# Patient Record
Sex: FEMALE | Race: WHITE | Employment: FULL TIME | URBAN - METROPOLITAN AREA
[De-identification: names, ages, dates, MRNs, and addresses within clinical notes are randomized per-mention and may not be internally consistent; named-entity substitution may affect disease eponyms.]

---

## 2017-04-20 LAB
CREATININE, EXTERNAL: 0.63
HBA1C MFR BLD HPLC: 5.9 %

## 2018-06-06 ENCOUNTER — OFFICE VISIT (OUTPATIENT)
Dept: ENDOCRINOLOGY | Age: 53
End: 2018-06-06

## 2018-06-06 VITALS
BODY MASS INDEX: 31.52 KG/M2 | TEMPERATURE: 97.9 F | HEIGHT: 67 IN | SYSTOLIC BLOOD PRESSURE: 140 MMHG | RESPIRATION RATE: 14 BRPM | WEIGHT: 200.8 LBS | OXYGEN SATURATION: 96 % | DIASTOLIC BLOOD PRESSURE: 75 MMHG | HEART RATE: 70 BPM

## 2018-06-06 DIAGNOSIS — E06.3 HYPOTHYROIDISM DUE TO HASHIMOTO'S THYROIDITIS: Primary | ICD-10-CM

## 2018-06-06 DIAGNOSIS — E03.9 ACQUIRED HYPOTHYROIDISM: Primary | ICD-10-CM

## 2018-06-06 DIAGNOSIS — E28.2 PCOS (POLYCYSTIC OVARIAN SYNDROME): ICD-10-CM

## 2018-06-06 DIAGNOSIS — E03.8 HYPOTHYROIDISM DUE TO HASHIMOTO'S THYROIDITIS: Primary | ICD-10-CM

## 2018-06-06 RX ORDER — FENOFIBRATE 160 MG/1
160 TABLET ORAL DAILY
COMMUNITY

## 2018-06-06 RX ORDER — LISINOPRIL 20 MG/1
30 TABLET ORAL DAILY
COMMUNITY

## 2018-06-06 RX ORDER — LEVOTHYROXINE SODIUM 200 UG/1
200 TABLET ORAL
Qty: 34 TAB | Refills: 11 | Status: SHIPPED | OUTPATIENT
Start: 2018-06-06 | End: 2019-05-31 | Stop reason: SDUPTHER

## 2018-06-06 RX ORDER — PRAVASTATIN SODIUM 20 MG/1
20 TABLET ORAL
COMMUNITY
End: 2020-10-12

## 2018-06-06 RX ORDER — OMEGA-3-ACID ETHYL ESTERS 1 G/1
2 CAPSULE, LIQUID FILLED ORAL 2 TIMES DAILY
COMMUNITY

## 2018-06-06 RX ORDER — LEVOTHYROXINE SODIUM 200 UG/1
TABLET ORAL
COMMUNITY
End: 2018-06-06 | Stop reason: DRUGHIGH

## 2018-06-06 RX ORDER — METFORMIN HYDROCHLORIDE 500 MG/1
1000 TABLET ORAL 2 TIMES DAILY WITH MEALS
COMMUNITY

## 2018-06-06 RX ORDER — LEVOTHYROXINE SODIUM 25 UG/1
TABLET ORAL
COMMUNITY
End: 2018-06-06 | Stop reason: DRUGHIGH

## 2018-06-06 NOTE — PROGRESS NOTES
Gene Deluna is a 46 y.o. female here for   Chief Complaint   Patient presents with    New Patient     Thyroid       1. Have you been to the ER, urgent care clinic since your last visit? Hospitalized since your last visit? - no    2. Have you seen or consulted any other health care providers outside of the [address] [place] [address] since your last visit?   Include any pap smears or colon screening.- PCP

## 2018-06-06 NOTE — PROGRESS NOTES
[facility]               Roger Blanco MD        [address] 78 [phone] Fax [phone]             Patient Information  Date:6/9/2018  Name : Starla Kilgore 46 y.o.     YOB: 1965         Referred by: Kathy Pham MD         History of present illness    Starla Kilgore is a 46 y.o. female  here for evaluation of thyroid. She had Graves' disease status post radioactive iodine therapy followed by post ablation hypothyroidism on Thyroxine replacement. Reports fluctuating TSH. She is taking the medication consistently by itself. Complains of fatigue, cold intolerance, gaining weight but attributes to lack of exercise. She is also menopausal now. History of PCO S.    Hirsutism status post laser therapy. No history of gluten sensitivity. Family history of thyroid disease. Father has type 1 diabetes      No change in the size of the neck or neck pain. No dysphagia,dysphonia or dyspnea. No history of known radiation exposure      Wt Readings from Last 3 Encounters:   06/06/18 200 lb 12.8 oz (91.1 kg)       Past Medical History:   Diagnosis Date    DM [facility]diabetes mellitus) ([facility] [address]     Fatty liver     HTN (hypertension)     Hyperlipidemia     Hypothyroidism     PCOS (polycystic ovarian syndrome)     Rosacea     Sleep apnea        Current Outpatient Prescriptions   Medication Sig    omega-3 acid ethyl esters (LOVAZA) 1 gram capsule Take 2 g by mouth two (2) times a day.  fenofibrate (LOFIBRA) 160 mg tablet Take 160 mg by mouth daily.  pravastatin (PRAVACHOL) 20 mg tablet Take 20 mg by mouth nightly.  metFORMIN (GLUCOPHAGE) 500 mg tablet Take  by mouth two (2) times daily (with meals).  lisinopril (PRINIVIL, ZESTRIL) 10 mg tablet Take  by mouth daily.  UNITHROID 200 mcg tablet Take 1 Tab by mouth Daily (before breakfast). Take 2 tabs on Sundays.  Brand medically necessary     No current facility-administered medications for this visit. No Known Allergies      Review of Systems:  All 10 systems  reviewed and are negative other than mentioned in HPI    Physical Examination:  Blood pressure 140/75, pulse 70, temperature 97.9 °F (36.6 °C), temperature source Oral, resp. rate 14, height 5' 7\" (1.702 m), weight 200 lb 12.8 oz (91.1 kg), SpO2 96 %. - General: pleasant, no distress, good eye contact  - HEENT: no exopthalmos, no periorbital edema, no scleral/conjunctival injection, EOMI, no lid lag or stare  - Neck: supple, no  thyromegaly, no nodules,no lymphadenopathy  - Cardiovascular: regular, normal rate, normal S1 and S2,  - Respiratory: clear to auscultation bilaterally  - Gastrointestinal: soft, nontender, nondistended, BS +  - Musculoskeletal: no proximal muscle weakness in upper or lower extremities  - Integumentary: no tremors, no edema,  - Neurological:alert and oriented   - Psychiatric: normal mood and affect    Data Reviewed:     [] Reviewed labs      Assessment/Plan:     1. Hypothyroidism due to Hashimoto's thyroiditis        Primary hypothyroidism   Discussed the natural course of hypothyroidism and instructions for levothyroxine. Discussed about the various factors which can affect TSH including medications,weight change,illness, compliance and instructions to take LT4 by itself to keep the levels stable. Discussed various causes of weight gain ,association with other autoimmune conditions,stressed the importance of life style changes. Switch to Unithroid 200 mcg 1 tablet daily, 2 tablets on Sunday  Follow TSH. Likely not absorbing the medication as she is on a higher dose than her body weight    PCOS -discussed the metabolic risks, continue metformin  History of DVT  Postmenopausal  Discussed importance of exercise    There are no Patient Instructions on file for this visit. Follow-up Disposition:  Return in about 5 months (around 11/6/2018).         Patient /caregiver verbalized understanding Voice-recognition software was used to generate this report, which may result in some phonetic-based errors in the grammar and contents. Even though attempts were made to correct all the mistakes, some may have been missed and remained in the body of the report.

## 2018-06-06 NOTE — MR AVS SNAPSHOT
[address] [facility] [address] 
[phone] Patient: Amita Lopez MRN: [id number] Visit Information Date & Time Provider Department Dept. Phone Encounter #  
 6/6/2018  9:00 AM Steph Layton MD [facility] Diabetes & Endocrinology [phone] [id number] Follow-up Instructions Return in about 5 months (around 11/6/2018). Upcoming Health Maintenance Date Due Hepatitis C Screening 1965 DTaP/Tdap/Td series (1 - Tdap) 9/20/1986 PAP AKA CERVICAL CYTOLOGY 9/20/1986 BREAST CANCER SCRN MAMMOGRAM 9/20/2015 FOBT Q 1 YEAR AGE 50-75 9/20/2015 Influenza Age 5 to Adult 8/1/2018 Allergies as of 6/6/2018  Review Complete On: 6/6/2018 By: Steph Layton MD  
 No Known Allergies Current Immunizations  Never Reviewed No immunizations on file. Not reviewed this visit You Were Diagnosed With   
  
 Codes Comments Hypothyroidism due to Hashimoto's thyroiditis    -  Primary ICD-10-CM: E03.8, E06.3 ICD-9-CM: 244.8, 245.2 Vitals BP Pulse Temp Resp Height(growth percentile) Weight(growth percentile) 140/75 (BP 1 Location: Right arm, BP Patient Position: Sitting) 70 97.9 °F (36.6 °C) (Oral) 14 5' 7\" (1.702 m) 200 lb 12.8 oz (91.1 kg) SpO2 BMI Smoking Status 96% 31.45 kg/m2 Never Smoker Vitals History BMI and BSA Data Body Mass Index Body Surface Area  
 31.45 kg/m 2 2.08 m 2 Your Updated Medication List  
  
   
This list is accurate as of 6/6/18 10:08 AM.  Always use your most recent med list.  
  
  
  
  
 fenofibrate 160 mg tablet Commonly known as:  LOFIBRA Take 160 mg by mouth daily. * levothyroxine 200 mcg tablet Commonly known as:  SYNTHROID Take  by mouth Daily (before breakfast). * levothyroxine 25 mcg tablet Commonly known as:  SYNTHROID Take  by mouth Daily (before breakfast). lisinopril 10 mg tablet Commonly known as:  Joaquin Jacy Take  by mouth daily. metFORMIN 500 mg tablet Commonly known as:  GLUCOPHAGE Take  by mouth two (2) times daily (with meals). omega-3 acid ethyl esters 1 gram capsule Commonly known as:  Shayne Catena Take 2 g by mouth two (2) times a day. pravastatin 20 mg tablet Commonly known as:  PRAVACHOL Take 20 mg by mouth nightly. * Notice: This list has 2 medication(s) that are the same as other medications prescribed for you. Read the directions carefully, and ask your doctor or other care provider to review them with you. Follow-up Instructions Return in about 5 months (around 11/6/2018). Introducing [facility]! Aida Mccormack introduces [employer] patient portal. Now you can access parts of your medical record, email your doctor's office, and request medication refills online. 1. In your internet browser, go to [email or web address] 2. Click on the First Time User? Click Here link in the Sign In box. You will see the New Member Sign Up page. 3. Enter your [employer] Access Code exactly as it appears below. You will not need to use this code after youve completed the sign-up process. If you do not sign up before the expiration date, you must request a new code. · [employer] Access Code: [email or web address] Expires: 9/4/2018 10:06 AM 
 
4. Enter the last four digits of your Social Security Number (xxxx) and Date of Birth (mm/dd/yyyy) as indicated and click Submit. You will be taken to the next sign-up page. 5. Create a [employer] ID. This will be your [employer] login ID and cannot be changed, so think of one that is secure and easy to remember. 6. Create a [employer] password. You can change your password at any time. 7. Enter your Password Reset Question and Answer. This can be used at a later time if you forget your password. 8. Enter your e-mail address. You will receive e-mail notification when new information is available in [address] 9. Click Sign Up. You can now view and download portions of your medical record. 10. Click the Download Summary menu link to download a portable copy of your medical information. If you have questions, please visit the Frequently Asked Questions section of the [employer] website. Remember, [employer] is NOT to be used for urgent needs. For medical emergencies, dial 911. Now available from your iPhone and Android! Please provide this summary of care documentation to your next provider. Your primary care clinician is listed as Donavon Cox. If you have any questions after today's visit, please call [phone].

## 2018-06-09 PROBLEM — E03.8 HYPOTHYROIDISM DUE TO HASHIMOTO'S THYROIDITIS: Status: ACTIVE | Noted: 2018-06-09

## 2018-06-09 PROBLEM — E06.3 HYPOTHYROIDISM DUE TO HASHIMOTO'S THYROIDITIS: Status: ACTIVE | Noted: 2018-06-09

## 2018-06-09 PROBLEM — E28.2 PCOS (POLYCYSTIC OVARIAN SYNDROME): Status: ACTIVE | Noted: 2018-06-09

## 2019-02-04 ENCOUNTER — OFFICE VISIT (OUTPATIENT)
Dept: ENDOCRINOLOGY | Age: 54
End: 2019-02-04

## 2019-02-04 VITALS
DIASTOLIC BLOOD PRESSURE: 80 MMHG | HEIGHT: 67 IN | BODY MASS INDEX: 31.66 KG/M2 | HEART RATE: 82 BPM | TEMPERATURE: 99.1 F | WEIGHT: 201.7 LBS | SYSTOLIC BLOOD PRESSURE: 160 MMHG | RESPIRATION RATE: 14 BRPM

## 2019-02-04 DIAGNOSIS — E03.8 HYPOTHYROIDISM DUE TO HASHIMOTO'S THYROIDITIS: ICD-10-CM

## 2019-02-04 DIAGNOSIS — E06.3 HYPOTHYROIDISM DUE TO HASHIMOTO'S THYROIDITIS: ICD-10-CM

## 2019-02-04 DIAGNOSIS — E28.2 PCOS (POLYCYSTIC OVARIAN SYNDROME): Primary | ICD-10-CM

## 2019-02-04 RX ORDER — MENTHOL
400 GEL (GRAM) TOPICAL 2 TIMES DAILY
COMMUNITY

## 2019-02-04 RX ORDER — ROSUVASTATIN CALCIUM 40 MG/1
TABLET, COATED ORAL
Refills: 4 | COMMUNITY
Start: 2019-01-28

## 2019-02-04 RX ORDER — NORETHINDRONE ACETATE AND ETHINYL ESTRADIOL AND FERROUS FUMARATE 1MG-20(21)
KIT ORAL
Refills: 3 | COMMUNITY
Start: 2019-01-31 | End: 2019-08-12

## 2019-02-04 NOTE — PROGRESS NOTES
[facility] Eloisa Corea MD 
 
  
 
 
Patient Information Date:2/4/2019 Name : Maxi Fagan 48 y.o.    
YOB: 1965 Referred by: Miguel Pittman MD  
 
 
 
History of present illness Maxi Fagan is a 48 y.o. female  here for evaluation of thyroid. She had Graves' disease status post radioactive iodine therapy followed by post ablation hypothyroidism on Thyroxine replacement. Reports fluctuating TSH. She is taking the medication consistently by itself. Complains of fatigue, cold intolerance, gaining weight but attributes to lack of exercise. She is also menopausal now. History of PCOS. Hirsutism status post laser therapy. No history of gluten sensitivity. Family history of thyroid disease. Father has type 1 diabetes She is on Unithroid, taking it consistently Reduced activity, used to exercise in the past and felt better No change in the size of the neck or neck pain. No dysphagia,dysphonia or dyspnea. No history of known radiation exposure Wt Readings from Last 3 Encounters:  
02/04/19 201 lb 11.2 oz (91.5 kg) 06/06/18 200 lb 12.8 oz (91.1 kg) Past Medical History:  
Diagnosis Date  DM (diabetes mellitus) ([address]  Fatty liver  HTN (hypertension)  Hyperlipidemia  Hypothyroidism  PCOS (polycystic ovarian syndrome)  Rosacea  Sleep apnea Current Outpatient Medications Medication Sig  
 vitamin e (E GEMS) 1,000 unit capsule Take 1,000 Units by mouth two (2) times a day.  JUNEL FE 1/20, 28, 1 mg-20 mcg (21)/75 mg (7) tab TK 1 T PO ONCE A DAY FOR 28 DAYS  omega-3 acid ethyl esters (LOVAZA) 1 gram capsule Take 2 g by mouth two (2) times a day.  fenofibrate (LOFIBRA) 160 mg tablet Take 160 mg by mouth daily.  pravastatin (PRAVACHOL) 20 mg tablet Take 20 mg by mouth nightly.  metFORMIN (GLUCOPHAGE) 500 mg tablet Take  by mouth two (2) times daily (with meals).  lisinopril (PRINIVIL, ZESTRIL) 20 mg tablet Take 20 mg by mouth daily.  UNITHROID 200 mcg tablet Take 1 Tab by mouth Daily (before breakfast). Take 2 tabs on Sundays. Brand medically necessary  rosuvastatin (CRESTOR) 40 mg tablet TK 1 T PO QD No current facility-administered medications for this visit. No Known Allergies Review of Systems:  All 10 systems  reviewed and are negative other than mentioned in HPI Physical Examination: 
Blood pressure 160/80, pulse 82, temperature 99.1 °F (37.3 °C), temperature source Oral, resp. rate 14, height 5' 7\" (1.702 m), weight 201 lb 11.2 oz (91.5 kg). - General: pleasant, no distress, good eye contact 
- HEENT: no exopthalmos, no periorbital edema, no scleral/conjunctival injection, EOMI, no lid lag or stare 
- Neck: supple, no  thyromegaly,  
- Cardiovascular: regular, normal rate, normal S1 and S2, 
- Respiratory: clear to auscultation bilaterally - Gastrointestinal: soft, nontender, nondistended, BS + 
- Musculoskeletal: no proximal muscle weakness in upper or lower extremities - Integumentary: no tremors, no edema, 
- Neurological:alert and oriented - Psychiatric: normal mood and affect Data Reviewed:  
 
[] Reviewed labs Assessment/Plan: 1. PCOS (polycystic ovarian syndrome) 2. Hypothyroidism due to Hashimoto's thyroiditis Primary hypothyroidism Unithroid 200 mcg 1 tablet daily, 2 tablets on Sunday PCOS -understands metabolic risks, continue metformin History of DVT Postmenopausal 
Discussed importance of exercise There are no Patient Instructions on file for this visit. Follow-up Disposition: Not on File Patient /caregiver verbalized understanding Voice-recognition software was used to generate this report, which may result in some phonetic-based errors in the grammar and contents.   Even though attempts were made to correct all the mistakes, some may have been missed and remained in the body of the report.

## 2019-02-04 NOTE — PROGRESS NOTES
Nicolle Thornton is a 48 y.o. female here for Chief Complaint Patient presents with  Thyroid Problem 1. Have you been to the ER, urgent care clinic since your last visit? Hospitalized since your last visit? -no 
 
2. Have you seen or consulted any other health care providers outside of the [address] [place] [address] since your last visit? Include any pap smears or colon screening. -PCP

## 2019-05-31 DIAGNOSIS — E03.9 ACQUIRED HYPOTHYROIDISM: ICD-10-CM

## 2019-05-31 RX ORDER — LEVOTHYROXINE SODIUM 200 UG/1
TABLET ORAL
Qty: 34 TAB | Refills: 0 | Status: SHIPPED | OUTPATIENT
Start: 2019-05-31 | End: 2019-06-28 | Stop reason: SDUPTHER

## 2019-06-28 DIAGNOSIS — E03.9 ACQUIRED HYPOTHYROIDISM: ICD-10-CM

## 2019-06-28 RX ORDER — LEVOTHYROXINE SODIUM 200 UG/1
TABLET ORAL
Qty: 34 TAB | Refills: 0 | Status: SHIPPED | OUTPATIENT
Start: 2019-06-28 | End: 2019-07-25 | Stop reason: SDUPTHER

## 2019-07-03 LAB
CREATININE, EXTERNAL: 0.68
HBA1C MFR BLD HPLC: 9.7 %
LDL-C, EXTERNAL: 40

## 2019-07-25 DIAGNOSIS — E03.9 ACQUIRED HYPOTHYROIDISM: ICD-10-CM

## 2019-07-25 RX ORDER — LEVOTHYROXINE SODIUM 200 UG/1
TABLET ORAL
Qty: 34 TAB | Refills: 0 | Status: SHIPPED | OUTPATIENT
Start: 2019-07-25 | End: 2019-08-12 | Stop reason: SDUPTHER

## 2019-08-09 NOTE — PROGRESS NOTES
Jennie Lofton is a 48 y.o. female here for   Chief Complaint   Patient presents with    Thyroid Problem    PCOS       1. Have you been to the ER, urgent care clinic since your last visit? Hospitalized since your last visit? -no    2. Have you seen or consulted any other health care providers outside of the [address] [place] [address] since your last visit? Include any pap smears or colon screening. -PCP

## 2019-08-12 ENCOUNTER — OFFICE VISIT (OUTPATIENT)
Dept: ENDOCRINOLOGY | Age: 54
End: 2019-08-12

## 2019-08-12 VITALS
WEIGHT: 198 LBS | BODY MASS INDEX: 31.08 KG/M2 | HEIGHT: 67 IN | OXYGEN SATURATION: 97 % | SYSTOLIC BLOOD PRESSURE: 142 MMHG | DIASTOLIC BLOOD PRESSURE: 72 MMHG | HEART RATE: 72 BPM | RESPIRATION RATE: 14 BRPM | TEMPERATURE: 98.4 F

## 2019-08-12 DIAGNOSIS — E28.2 PCOS (POLYCYSTIC OVARIAN SYNDROME): Primary | ICD-10-CM

## 2019-08-12 DIAGNOSIS — E03.8 HYPOTHYROIDISM DUE TO HASHIMOTO'S THYROIDITIS: ICD-10-CM

## 2019-08-12 DIAGNOSIS — E06.3 HYPOTHYROIDISM DUE TO HASHIMOTO'S THYROIDITIS: ICD-10-CM

## 2019-08-12 DIAGNOSIS — E03.9 ACQUIRED HYPOTHYROIDISM: ICD-10-CM

## 2019-08-12 RX ORDER — LEVOTHYROXINE SODIUM 200 UG/1
TABLET ORAL
Qty: 34 TAB | Refills: 0 | Status: SHIPPED | OUTPATIENT
Start: 2019-08-12 | End: 2019-08-12 | Stop reason: SDUPTHER

## 2019-08-12 RX ORDER — LEVOTHYROXINE SODIUM 200 UG/1
TABLET ORAL
Qty: 30 TAB | Refills: 11 | Status: SHIPPED | OUTPATIENT
Start: 2019-08-12 | End: 2020-02-10 | Stop reason: SDUPTHER

## 2019-08-12 NOTE — LETTER
8/15/19 Patient: Ilana Mooney YOB: 1965 Date of Visit: 8/12/2019 Nathanael Closs, MD 
[address] [facility] [address] [facility] [address] VIA Facsimile: [phone] Dear Nathanael Closs, MD, Thank you for referring Ms. Joana Ceballos to [facility]OLOGY for evaluation. My notes for this consultation are attached. If you have questions, please do not hesitate to call me. I look forward to following your patient along with you. Sincerely, Kael Umaña MD

## 2019-08-12 NOTE — PROGRESS NOTES
Biju Lofton MD           Patient Information  Date:8/12/2019  Name : Maureen Mart 48 y.o.     YOB: 1965         Referred by: Raquel Monte MD         History of present illness    Maureen Mart is a 48 y.o. female  here for follow-up of thyroid. She had Graves' disease status post radioactive iodine therapy followed by post ablation hypothyroidism on Thyroxine replacement. History of PCOS  Postmenopausal  She has discontinued OCPs  Had labs at PCPs office  Hirsutism status post laser therapy. No history of gluten sensitivity. Family history of thyroid disease. Father has type 1 diabetes  She is on Unithroid, taking it consistently  Lost couple of pounds        No change in the size of the neck or neck pain. No dysphagia,dysphonia or dyspnea. No history of known radiation exposure      Wt Readings from Last 3 Encounters:   08/12/19 198 lb (89.8 kg)   02/04/19 201 lb 11.2 oz (91.5 kg)   06/06/18 200 lb 12.8 oz (91.1 kg)       Past Medical History:   Diagnosis Date    DM (diabetes mellitus) ([address]     Fatty liver     HTN (hypertension)     Hyperlipidemia     Hypothyroidism     PCOS (polycystic ovarian syndrome)     Rosacea     Sleep apnea        Current Outpatient Medications   Medication Sig    UNITHROID 200 mcg tablet TAKE 1 TABLET BY MOUTH DAILY BEFORE BREAKFAST. TABLET 2 TABLETS BY MOUTH ON SUNDAY    vitamin e (E GEMS) 1,000 unit capsule Take 1,000 Units by mouth two (2) times a day.  rosuvastatin (CRESTOR) 40 mg tablet TK 1 T PO QD    omega-3 acid ethyl esters (LOVAZA) 1 gram capsule Take 2 g by mouth two (2) times a day.  fenofibrate (LOFIBRA) 160 mg tablet Take 160 mg by mouth daily.  metFORMIN (GLUCOPHAGE) 500 mg tablet Take  by mouth two (2) times daily (with meals).  lisinopril (PRINIVIL, ZESTRIL) 20 mg tablet Take 20 mg by mouth daily.     JUNEL FE 1/20, 28, 1 mg-20 mcg (21)/75 mg (7) tab TK 1 T PO ONCE A DAY FOR 28 DAYS    pravastatin (PRAVACHOL) 20 mg tablet Take 20 mg by mouth nightly. No current facility-administered medications for this visit. No Known Allergies      Review of Systems:  All 10 systems  reviewed and are negative other than mentioned in HPI    Physical Examination:  Blood pressure 142/72, pulse 72, temperature 98.4 °F (36.9 °C), temperature source Oral, resp. rate 14, height 5' 7\" (1.702 m), weight 198 lb (89.8 kg), SpO2 97 %. - General: pleasant, no distress, good eye contact  - HEENT: no exopthalmos, no periorbital edema, no scleral/conjunctival injection, EOMI, no lid lag or stare  - Neck: supple, no  thyromegaly,   - Cardiovascular: regular, normal rate, normal S1 and S2,  - Respiratory: clear to auscultation bilaterally  - Gastrointestinal: soft, nontender, nondistended, BS +  - Musculoskeletal: no proximal muscle weakness in upper or lower extremities  - Integumentary: no tremors, no edema,  - Neurological:alert and oriented   - Psychiatric: normal mood and affect    Data Reviewed:     [] Reviewed labs      Assessment/Plan:     1. PCOS (polycystic ovarian syndrome)    2. Hypothyroidism due to Hashimoto's thyroiditis        Primary hypothyroidism    Unithroid 200 mcg 1 tablet daily,  Decrease the dose  TSH was suppressed when done at PCPs office  No iodine exposure, illness at that time      PCOS -understands metabolic risks, continue metformin  History of DVT  Postmenopausal  Discussed importance of exercise    She has discontinued OCPs      There are no Patient Instructions on file for this visit. Patient /caregiver verbalized understanding   Voice-recognition software was used to generate this report, which may result in some phonetic-based errors in the grammar and contents. Even though attempts were made to correct all the mistakes, some may have been missed and remained in the body of the report.

## 2020-02-07 NOTE — PROGRESS NOTES
Gerhard Somers is a 47 y.o. female here for   Chief Complaint   Patient presents with    PCOS    Thyroid Problem       1. Have you been to the ER, urgent care clinic since your last visit? Hospitalized since your last visit? -no    2. Have you seen or consulted any other health care providers outside of the [address] [place] [address] since your last visit? Include any pap smears or colon screening. -PCP

## 2020-02-10 ENCOUNTER — OFFICE VISIT (OUTPATIENT)
Dept: ENDOCRINOLOGY | Age: 55
End: 2020-02-10

## 2020-02-10 VITALS
HEIGHT: 67 IN | WEIGHT: 199 LBS | RESPIRATION RATE: 16 BRPM | TEMPERATURE: 97.7 F | HEART RATE: 69 BPM | SYSTOLIC BLOOD PRESSURE: 132 MMHG | BODY MASS INDEX: 31.23 KG/M2 | OXYGEN SATURATION: 96 % | DIASTOLIC BLOOD PRESSURE: 60 MMHG

## 2020-02-10 DIAGNOSIS — E28.2 PCOS (POLYCYSTIC OVARIAN SYNDROME): ICD-10-CM

## 2020-02-10 DIAGNOSIS — E66.9 NON MORBID OBESITY: ICD-10-CM

## 2020-02-10 DIAGNOSIS — E06.3 HYPOTHYROIDISM DUE TO HASHIMOTO'S THYROIDITIS: Primary | ICD-10-CM

## 2020-02-10 DIAGNOSIS — E03.8 HYPOTHYROIDISM DUE TO HASHIMOTO'S THYROIDITIS: Primary | ICD-10-CM

## 2020-02-10 DIAGNOSIS — E03.9 ACQUIRED HYPOTHYROIDISM: ICD-10-CM

## 2020-02-10 RX ORDER — LEVOTHYROXINE SODIUM 200 UG/1
TABLET ORAL
Qty: 30 TAB | Refills: 11 | Status: SHIPPED | OUTPATIENT
Start: 2020-02-10 | End: 2020-08-18 | Stop reason: SDUPTHER

## 2020-02-10 NOTE — PROGRESS NOTES
Smitha Suarez MD           Patient Information  Date:2/10/2020  Name : Brett Marquez 47 y.o.     YOB: 1965         Referred by: Loren Souza MD         History of present illness    Brett Marquez is a 47 y.o. female  here for follow-up of thyroid. She had Graves' disease status post radioactive iodine therapy followed by post ablation hypothyroidism on Thyroxine replacement. History of PCOS  Postmenopausal  Had labs at PCPs office, TSH is 0.112  No recent infection, steroids, antibiotics, iodine exposure, biotin use    Hirsutism status post laser therapy. No history of gluten sensitivity. Family history of thyroid disease. Father has type 1 diabetes  She is on Unithroid, taking it consistently        No change in the size of the neck or neck pain. No dysphagia,dysphonia or dyspnea. No history of known radiation exposure      Wt Readings from Last 3 Encounters:   02/10/20 199 lb (90.3 kg)   08/12/19 198 lb (89.8 kg)   02/04/19 201 lb 11.2 oz (91.5 kg)       Past Medical History:   Diagnosis Date    DM (diabetes mellitus) ([address]     Fatty liver     HTN (hypertension)     Hyperlipidemia     Hypothyroidism     PCOS (polycystic ovarian syndrome)     Rosacea     Sleep apnea        Current Outpatient Medications   Medication Sig    vitamin e (E GEMS) 1,000 unit capsule Take 1,000 Units by mouth two (2) times a day.  rosuvastatin (CRESTOR) 40 mg tablet TK 1 T PO QD    omega-3 acid ethyl esters (LOVAZA) 1 gram capsule Take 2 g by mouth two (2) times a day.  fenofibrate (LOFIBRA) 160 mg tablet Take 160 mg by mouth daily.  metFORMIN (GLUCOPHAGE) 500 mg tablet Take  by mouth two (2) times daily (with meals).  lisinopril (PRINIVIL, ZESTRIL) 20 mg tablet Take 20 mg by mouth daily.  UNITHROID 200 mcg tablet TAKE 1 TABLET BY MOUTH DAILY BEFORE BREAKFAST.  1/2 TAB ON SUNDAYS    pravastatin (PRAVACHOL) 20 mg tablet Take 20 mg by mouth nightly. No current facility-administered medications for this visit. No Known Allergies      Review of Systems:  All 10 systems  reviewed and are negative other than mentioned in HPI    Physical Examination:  Blood pressure 132/60, pulse 69, temperature 97.7 °F (36.5 °C), temperature source Oral, resp. rate 16, height 5' 7\" (1.702 m), weight 199 lb (90.3 kg), SpO2 96 %. - General: pleasant, no distress, good eye contact  - HEENT: no exopthalmos, no periorbital edema, no scleral/conjunctival injection, EOMI, no lid lag or stare  - Neck: supple,  - Cardiovascular: regular, normal rate, normal S1 and S2,  - Respiratory: clear to auscultation bilaterally  - Gastrointestinal: soft, nontender, nondistended, BS +  - Musculoskeletal: no proximal muscle weakness in upper or lower extremities  - Integumentary: no tremors, no edema,  - Neurological:alert and oriented   - Psychiatric: normal mood and affect    Data Reviewed:     [] Reviewed labs      Assessment/Plan:     1. Hypothyroidism due to Hashimoto's thyroiditis    2. PCOS (polycystic ovarian syndrome)    3. Non morbid obesity        Primary hypothyroidism    Unithroid 200 mcg 1 tablet daily, 1/2 tablet on Sunday     TSH 0.112  No iodine exposure, illness at that time      PCOS - continue metformin  History of DVT  Postmenopausal  No known gluten sensitivity, she is cutting down on the wheat products  Off OCPs  Does not want to increase the metformin    Nonmorbid obesity:    There are no Patient Instructions on file for this visit. Follow-up and Dispositions    · Return in about 8 months (around 10/10/2020). Patient /caregiver verbalized understanding   Voice-recognition software was used to generate this report, which may result in some phonetic-based errors in the grammar and contents. Even though attempts were made to correct all the mistakes, some may have been missed and remained in the body of the report.

## 2020-08-18 DIAGNOSIS — E03.9 ACQUIRED HYPOTHYROIDISM: ICD-10-CM

## 2020-08-18 RX ORDER — LEVOTHYROXINE SODIUM 200 UG/1
TABLET ORAL
Qty: 30 TAB | Refills: 11 | Status: SHIPPED | OUTPATIENT
Start: 2020-08-18 | End: 2021-02-12

## 2020-10-12 ENCOUNTER — OFFICE VISIT (OUTPATIENT)
Dept: ENDOCRINOLOGY | Age: 55
End: 2020-10-12
Payer: COMMERCIAL

## 2020-10-12 VITALS
RESPIRATION RATE: 14 BRPM | SYSTOLIC BLOOD PRESSURE: 120 MMHG | TEMPERATURE: 97.4 F | OXYGEN SATURATION: 98 % | HEART RATE: 68 BPM | WEIGHT: 195 LBS | HEIGHT: 67 IN | DIASTOLIC BLOOD PRESSURE: 73 MMHG | BODY MASS INDEX: 30.61 KG/M2

## 2020-10-12 DIAGNOSIS — E03.8 HYPOTHYROIDISM DUE TO HASHIMOTO'S THYROIDITIS: Primary | ICD-10-CM

## 2020-10-12 DIAGNOSIS — R73.9 HYPERGLYCEMIA: ICD-10-CM

## 2020-10-12 DIAGNOSIS — E06.3 HYPOTHYROIDISM DUE TO HASHIMOTO'S THYROIDITIS: Primary | ICD-10-CM

## 2020-10-12 DIAGNOSIS — E28.2 PCOS (POLYCYSTIC OVARIAN SYNDROME): ICD-10-CM

## 2020-10-12 PROCEDURE — 99214 OFFICE O/P EST MOD 30 MIN: CPT | Performed by: INTERNAL MEDICINE

## 2020-10-12 RX ORDER — SEMAGLUTIDE 1.34 MG/ML
0.5 INJECTION, SOLUTION SUBCUTANEOUS
COMMUNITY

## 2020-10-12 NOTE — PROGRESS NOTES
Andres Gillette is a 54 y.o. female here for   Chief Complaint   Patient presents with    Thyroid Problem    PCOS       1. Have you been to the ER, urgent care clinic since your last visit? Hospitalized since your last visit? -    2. Have you seen or consulted any other health care providers outside of the [address] [place] [address] since your last visit?   Include any pap smears or colon screening.-

## 2020-10-12 NOTE — PROGRESS NOTES
Anselmo Frey MD           Patient Information  Date:10/12/2020  Name : Otis Waddell 54 y.o.     YOB: 1965         Referred by: Gretchen Almaraz MD         History of present illness    Otis Waddell is a 54 y.o. female  here for follow-up of thyroid. She had Graves' disease status post radioactive iodine therapy followed by post ablation hypothyroidism on Thyroxine replacement. History of PCOS  Postmenopausal  No weight gain  No recent infection, steroids, antibiotics, iodine exposure, biotin use  Prediabetes, no prior history of diabetes mellitus, recently A1c was increased according to the patient and was started on Ozempic by PCP  She is on Metformin  Ozempic has decreased appetite        Hirsutism status post laser therapy. No history of gluten sensitivity. Family history of thyroid disease. Father has type 1 diabetes          No change in the size of the neck or neck pain. No dysphagia,dysphonia or dyspnea. No history of known radiation exposure      Wt Readings from Last 3 Encounters:   10/12/20 195 lb (88.5 kg)   02/10/20 199 lb (90.3 kg)   08/12/19 198 lb (89.8 kg)       Past Medical History:   Diagnosis Date    DM (diabetes mellitus) ([facility] [address]     Fatty liver     HTN (hypertension)     Hyperlipidemia     Hypothyroidism     PCOS (polycystic ovarian syndrome)     Rosacea     Sleep apnea        Current Outpatient Medications   Medication Sig    semaglutide (Ozempic) 0.25 mg/0.2 mL (2 mg/1.5 mL) sub-q pen 0.25 mg by SubCUTAneous route every seven (7) days.  Unithroid 200 mcg tablet TAKE 1 TABLET BY MOUTH DAILY BEFORE BREAKFAST. 1/2 TAB ON SUNDAYS    vitamin e (E GEMS) 1,000 unit capsule Take 400 Units by mouth two (2) times a day.  rosuvastatin (CRESTOR) 40 mg tablet TK 1 T PO QD    omega-3 acid ethyl esters (LOVAZA) 1 gram capsule Take 2 g by mouth two (2) times a day.     fenofibrate (LOFIBRA) 160 mg tablet Take 160 mg by mouth daily.  metFORMIN (GLUCOPHAGE) 500 mg tablet Take 1,000 mg by mouth two (2) times daily (with meals).  lisinopril (PRINIVIL, ZESTRIL) 20 mg tablet Take 20 mg by mouth daily.  pravastatin (PRAVACHOL) 20 mg tablet Take 20 mg by mouth nightly. No current facility-administered medications for this visit. No Known Allergies      Review of Systems:  All 10 systems  reviewed and are negative other than mentioned in HPI    Physical Examination:  Blood pressure 120/73, pulse 68, temperature 97.4 °F (36.3 °C), temperature source Oral, resp. rate 14, height 5' 7\" (1.702 m), weight 195 lb (88.5 kg), SpO2 98 %. - General: pleasant, no distress, good eye contact  - HEENT: no exopthalmos, no periorbital edema, no scleral/conjunctival injection, EOMI, no lid lag or stare  - Neck: supple,  - Cardiovascular: regular, normal rate, normal S1 and S2,  - Respiratory: clear to auscultation bilaterally  - Gastrointestinal: soft, nontender, nondistended, BS +  - Musculoskeletal: no proximal muscle weakness in upper or lower extremities  - Integumentary: no tremors, no edema,  - Neurological:alert and oriented   - Psychiatric: normal mood and affect    Data Reviewed:     [] Reviewed labs      Assessment/Plan:     1. Hypothyroidism due to Hashimoto's thyroiditis        Primary hypothyroidism    Unithroid 200 mcg 1 tablet daily, 1/2 tablet on Sunday   Labs reviewed      PCOS - continue metformin  History of DVT  Postmenopausal  No known gluten sensitivity, she is cutting down on the wheat products  Off OCPs  Does not want to increase the metformin    Nonmorbid obesity:    Type 2 DM -no prior history, last 3 to 4 months according to patient A1c was high, started on Ozempic which she is tolerating. Per recall fasting blood glucose less than 120      There are no Patient Instructions on file for this visit.           Patient /caregiver verbalized understanding   Voice-recognition software was used to generate this report, which may result in some phonetic-based errors in the grammar and contents. Even though attempts were made to correct all the mistakes, some may have been missed and remained in the body of the report. chaplaincy/clergy/

## 2020-10-12 NOTE — LETTER
10/16/20 Patient: Karina Paris YOB: 1965 Date of Visit: 10/12/2020 Tia Loredo MD 
[address] [facility] [address] [address] [address] [facility] [address] VIA In Basket Dear Tia Loredo MD, Thank you for referring Ms. Keith Thacker to [facility] for evaluation. My notes for this consultation are attached. If you have questions, please do not hesitate to call me. I look forward to following your patient along with you. Sincerely, Ayo Richardson MD

## 2021-02-12 DIAGNOSIS — E03.9 ACQUIRED HYPOTHYROIDISM: ICD-10-CM

## 2021-02-12 RX ORDER — LEVOTHYROXINE SODIUM 200 UG/1
TABLET ORAL
Qty: 30 TAB | Refills: 11 | Status: SHIPPED | OUTPATIENT
Start: 2021-02-12 | End: 2022-02-11

## 2021-04-29 LAB
CREATININE, EXTERNAL: 0.74
HBA1C MFR BLD HPLC: 6.2 %
LDL-C, EXTERNAL: 42

## 2021-05-10 ENCOUNTER — OFFICE VISIT (OUTPATIENT)
Dept: ENDOCRINOLOGY | Age: 56
End: 2021-05-10
Payer: COMMERCIAL

## 2021-05-10 VITALS
DIASTOLIC BLOOD PRESSURE: 77 MMHG | TEMPERATURE: 97.2 F | HEIGHT: 67 IN | OXYGEN SATURATION: 96 % | HEART RATE: 66 BPM | SYSTOLIC BLOOD PRESSURE: 152 MMHG | WEIGHT: 197 LBS | BODY MASS INDEX: 30.92 KG/M2

## 2021-05-10 DIAGNOSIS — E28.2 PCOS (POLYCYSTIC OVARIAN SYNDROME): ICD-10-CM

## 2021-05-10 DIAGNOSIS — E03.9 PRIMARY HYPOTHYROIDISM: Primary | ICD-10-CM

## 2021-05-10 PROCEDURE — 99214 OFFICE O/P EST MOD 30 MIN: CPT | Performed by: INTERNAL MEDICINE

## 2021-05-10 NOTE — PROGRESS NOTES
Vasyl Fenton MD           Patient Information  Date:5/10/2021  Name : Kirt Torres 54 y.o.     YOB: 1965         Referred by: Xander Torre MD         History of present illness    Kirt Torres is a 54 y.o. female  here for follow-up of hypothyroidism. She had Graves' disease status post radioactive iodine therapy followed by post ablation hypothyroidism on Thyroxine replacement. History of PCOS  Postmenopausal  Reviewed labs from April 2021, TSH 0.11,  No recent infection, steroids, antibiotics, iodine exposure, biotin use    Type 2 diabetes mellitus, on Metformin and Ozempic        Hirsutism status post laser therapy. No history of gluten sensitivity. Family history of thyroid disease. Father has type 1 diabetes          Wt Readings from Last 3 Encounters:   05/10/21 197 lb (89.4 kg)   10/12/20 195 lb (88.5 kg)   02/10/20 199 lb (90.3 kg)       Past Medical History:   Diagnosis Date    DM (diabetes mellitus) ([facility] [address]     Fatty liver     HTN (hypertension)     Hyperlipidemia     Hypothyroidism     PCOS (polycystic ovarian syndrome)     Rosacea     Sleep apnea        Current Outpatient Medications   Medication Sig    Unithroid 200 mcg tablet TAKE 1 TABLET BY MOUTH EVERY DAY BEFORE BREAKFAST AND 1/2 TABLET ON SUNDAYS    semaglutide (Ozempic) 0.25 mg/0.2 mL (2 mg/1.5 mL) sub-q pen 0.25 mg by SubCUTAneous route every seven (7) days.  vitamin e (E GEMS) 1,000 unit capsule Take 400 Units by mouth two (2) times a day.  rosuvastatin (CRESTOR) 40 mg tablet TK 1 T PO QD    omega-3 acid ethyl esters (LOVAZA) 1 gram capsule Take 2 g by mouth two (2) times a day.  fenofibrate (LOFIBRA) 160 mg tablet Take 160 mg by mouth daily.  metFORMIN (GLUCOPHAGE) 500 mg tablet Take 1,000 mg by mouth two (2) times daily (with meals).  lisinopril (PRINIVIL, ZESTRIL) 20 mg tablet Take 20 mg by mouth daily.      No current facility-administered medications for this visit. No Known Allergies      Review of Systems: Per HPI    Physical Examination:  Blood pressure (!) 152/77, pulse 66, temperature 97.2 °F (36.2 °C), height 5' 7\" (1.702 m), weight 197 lb (89.4 kg), SpO2 96 %. - General: pleasant, no distress, good eye contact  - HEENT: no exopthalmos, no periorbital edema, no scleral/conjunctival injection, EOMI, no lid lag or stare  - Neck: supple,  - Cardiovascular: regular, normal rate, normal S1 and S2,  - Respiratory: clear to auscultation bilaterally  - Musculoskeletal: no proximal muscle weakness in upper or lower extremities  - Integumentary: no tremors, no edema,  - Neurological:alert and oriented   - Psychiatric: normal mood and affect    Data Reviewed:     [x] Reviewed labs    TSH April 2021 0.11 assessment/Plan:     1. Primary hypothyroidism        Primary hypothyroidism    Unithroid 200 mcg 1 tablet daily, 1/2 tablet on Sunday   Labs reviewed, no changes needed, if TSH is low with the next blood draw Then need dose adjustment. PCOS - continue metformin  History of DVT  Postmenopausal  No known gluten sensitivity, she is cutting down on the wheat products  Off OCPs  Metformin    Nonmorbid obesity:    Type 2 DM -Metformin, Ozempic      There are no Patient Instructions on file for this visit. Patient /caregiver verbalized understanding   Voice-recognition software was used to generate this report, which may result in some phonetic-based errors in the grammar and contents. Even though attempts were made to correct all the mistakes, some may have been missed and remained in the body of the report.

## 2021-05-10 NOTE — LETTER
5/10/2021 Patient: Pedro Katz YOB: 1965 Date of Visit: 5/10/2021 Melanie Negron MD 
[address] [address] Via Fax: [phone] Dear Melanie Negron MD, Thank you for referring Ms. Marsha Pacheco to [facility] for evaluation. My notes for this consultation are attached. If you have questions, please do not hesitate to call me. I look forward to following your patient along with you. Sincerely, Julia Bro MD

## 2021-10-14 LAB
CREATININE, EXTERNAL: 0.75
HBA1C MFR BLD HPLC: 6.7 %

## 2022-02-11 DIAGNOSIS — E03.9 ACQUIRED HYPOTHYROIDISM: ICD-10-CM

## 2022-02-11 RX ORDER — LEVOTHYROXINE SODIUM 200 UG/1
TABLET ORAL
Qty: 30 TABLET | Refills: 11 | Status: SHIPPED | OUTPATIENT
Start: 2022-02-11 | End: 2022-02-14

## 2022-02-14 DIAGNOSIS — E03.9 ACQUIRED HYPOTHYROIDISM: ICD-10-CM

## 2022-02-14 RX ORDER — LEVOTHYROXINE SODIUM 200 UG/1
TABLET ORAL
Qty: 30 TABLET | Refills: 11 | Status: SHIPPED | OUTPATIENT
Start: 2022-02-14

## 2022-03-19 PROBLEM — E06.3 HYPOTHYROIDISM DUE TO HASHIMOTO'S THYROIDITIS: Status: ACTIVE | Noted: 2018-06-09

## 2022-03-19 PROBLEM — E03.8 HYPOTHYROIDISM DUE TO HASHIMOTO'S THYROIDITIS: Status: ACTIVE | Noted: 2018-06-09

## 2022-03-19 PROBLEM — E28.2 PCOS (POLYCYSTIC OVARIAN SYNDROME): Status: ACTIVE | Noted: 2018-06-09

## 2022-04-07 PROBLEM — Z87.410 HISTORY OF CERVICAL DYSPLASIA: Status: ACTIVE | Noted: 2022-04-07

## 2022-04-07 PROBLEM — E66.9 OBESITY: Status: ACTIVE | Noted: 2022-04-07

## 2022-04-07 PROBLEM — E78.00 HYPERCHOLESTEROLEMIA: Status: ACTIVE | Noted: 2022-04-07

## 2022-04-07 PROBLEM — I10 HYPERTENSION: Status: ACTIVE | Noted: 2022-04-07

## 2022-04-11 ENCOUNTER — OFFICE VISIT (OUTPATIENT)
Dept: OBGYN CLINIC | Age: 57
End: 2022-04-11
Payer: COMMERCIAL

## 2022-04-11 VITALS
HEIGHT: 67 IN | WEIGHT: 202 LBS | DIASTOLIC BLOOD PRESSURE: 76 MMHG | BODY MASS INDEX: 31.71 KG/M2 | SYSTOLIC BLOOD PRESSURE: 130 MMHG

## 2022-04-11 DIAGNOSIS — N95.1 MENOPAUSAL SYNDROME: ICD-10-CM

## 2022-04-11 DIAGNOSIS — Z12.4 SCREENING FOR MALIGNANT NEOPLASM OF CERVIX: Primary | ICD-10-CM

## 2022-04-11 DIAGNOSIS — Z01.419 ROUTINE GYNECOLOGICAL EXAMINATION: ICD-10-CM

## 2022-04-11 PROBLEM — Z87.410 HISTORY OF CERVICAL DYSPLASIA: Status: ACTIVE | Noted: 2022-04-07

## 2022-04-11 PROBLEM — I10 HYPERTENSION: Status: ACTIVE | Noted: 2022-04-07

## 2022-04-11 PROBLEM — E78.00 HYPERCHOLESTEROLEMIA: Status: ACTIVE | Noted: 2022-04-07

## 2022-04-11 PROBLEM — E66.9 OBESITY: Status: ACTIVE | Noted: 2022-04-07

## 2022-04-11 PROCEDURE — 99396 PREV VISIT EST AGE 40-64: CPT | Performed by: OBSTETRICS & GYNECOLOGY

## 2022-04-11 NOTE — PROGRESS NOTES
Rah Simeon is a , 64 y.o. female   No LMP recorded. (Menstrual status: Menopause). She presents for her annual    She is having no significant problems. Menstrual status:  Cycles are menopausal.    Flow: absent. She does not have dysmenorrhea. Medical conditions:  Since her last annual GYN exam about one year ago, she has not the following changes in her health history: none. Mammogram History:    JAY JAY Results (most recent):  No results found for this or any previous visit. DEXA Results (most recent):  No results found for this or any previous visit. Past Medical History:   Diagnosis Date    Abnormal Papanicolaou smear of cervix     DM (diabetes mellitus) ([address]     DVT (deep venous thrombosis) (HCC)     Fatty liver     History of cervical dysplasia 2022    HTN (hypertension)     Hypercholesterolemia 2022    Hyperlipidemia     Hypertension 2022    Hypothyroidism     Menopausal state     Obesity 2022    PCOS (polycystic ovarian syndrome)     Rosacea     Sleep apnea      Past Surgical History:   Procedure Laterality Date    HX CHOLECYSTECTOMY      HX GI      Cholecystectomy    HX GYN      LEEP    HX GYN      Colposcopy    HX ORTHOPAEDIC      repair of achilles    HX OTHER SURGICAL Right     achilles repair    HX PELVIC LAPAROSCOPY      ovarian drilling    HX TONSIL AND ADENOIDECTOMY         Prior to Admission medications    Medication Sig Start Date End Date Taking? Authorizing Provider   Unithroid 200 mcg tablet TAKE 1 TABLET BY MOUTH EVERY DAY BEFORE BREAKFAST AND 1/2 TABLET ON SUNDAYS 2/14/22  Yes Rosas Quintero MD   semaglutide (Ozempic) 0.25 mg/0.2 mL (2 mg/1.5 mL) sub-q pen 0.25 mg by SubCUTAneous route every seven (7) days. Yes Provider, Historical   vitamin e (E GEMS) 1,000 unit capsule Take 400 Units by mouth two (2) times a day.    Yes Provider, Historical   rosuvastatin (CRESTOR) 40 mg tablet TK 1 T PO QD 19 Yes Provider, Historical   omega-3 acid ethyl esters (LOVAZA) 1 gram capsule Take 2 g by mouth two (2) times a day. Yes Provider, Historical   fenofibrate (LOFIBRA) 160 mg tablet Take 160 mg by mouth daily. Yes Provider, Historical   metFORMIN (GLUCOPHAGE) 500 mg tablet Take 1,000 mg by mouth two (2) times daily (with meals). Yes Provider, Historical   lisinopril (PRINIVIL, ZESTRIL) 20 mg tablet Take 20 mg by mouth daily. Yes Provider, Historical       No Known Allergies       Tobacco History:  reports that she has never smoked. She has never used smokeless tobacco.  Alcohol use:  reports no history of alcohol use. Drug use:  reports no history of drug use. Family Medical/Cancer History:   Family History   Problem Relation Age of Onset    Other Father         non hodgkins lymphoma, non cancerous brain tumor    Diabetes Father     Hypertension Father     Elevated Lipids Father     Diabetes Mother     Hypertension Mother     Cancer Mother         breast    Other Mother         osteopenia    Elevated Lipids Mother           Review of Systems   Constitutional: Negative for chills, fever, malaise/fatigue and weight loss. HENT: Negative for congestion, ear pain, sinus pain and tinnitus. Eyes: Negative for blurred vision and double vision. Respiratory: Negative for cough, shortness of breath and wheezing. Cardiovascular: Negative for chest pain and palpitations. Gastrointestinal: Negative for abdominal pain, blood in stool, constipation, diarrhea, heartburn, nausea and vomiting. Genitourinary: Negative for dysuria, flank pain, frequency, hematuria and urgency. Musculoskeletal: Negative for joint pain and myalgias. Skin: Negative for itching and rash. Neurological: Negative for dizziness, weakness and headaches. Psychiatric/Behavioral: Negative for depression, memory loss and suicidal ideas. The patient is not nervous/anxious and does not have insomnia.           Physical Exam  Constitutional:       Appearance: Normal appearance. HENT:      Head: Normocephalic and atraumatic. Cardiovascular:      Rate and Rhythm: Normal rate. Heart sounds: Normal heart sounds. Pulmonary:      Effort: Pulmonary effort is normal.      Breath sounds: Normal breath sounds. Chest:   Breasts:      Right: Normal.      Left: Normal.       Abdominal:      General: Abdomen is flat. Palpations: Abdomen is soft. Genitourinary:     General: Normal vulva. Vagina: Normal.      Cervix: Normal.      Uterus: Normal.       Adnexa: Right adnexa normal and left adnexa normal.      Rectum: Normal.      Comments: PAP Obtained  Neurological:      Mental Status: She is alert. Psychiatric:         Mood and Affect: Mood normal.         Behavior: Behavior normal.         Thought Content: Thought content normal.          Visit Vitals  /76 (BP 1 Location: Left upper arm, BP Patient Position: Sitting, BP Cuff Size: Large adult)   Ht 5' 7\" (1.702 m)   Wt 202 lb (91.6 kg)   BMI 31.64 kg/m²         Assessment:   Diagnoses and all orders for this visit:    1. Screening for malignant neoplasm of cervix  -     PAP IG, RFX APTIMA HPV ASCUS ([id number])    2. Routine gynecological examination  -     PAP IG, RFX APTIMA HPV ASCUS ([id number])    3. Menopausal syndrome        Plan: Questions addressed  Counseled re: diet, exercise, healthy lifestyle  Return for Annual  Rec annual mammogram        Follow-up and Dispositions    · Return for 1 yr annual, 1 yr mammo.

## 2022-04-11 NOTE — PROGRESS NOTES
Chief Complaint   Patient presents with   [address] [facility] Patient     Annual Exam/Mammo here today     Visit Vitals  /76 (BP 1 Location: Left upper arm, BP Patient Position: Sitting, BP Cuff Size: Large adult)   Ht 5' 7\" (1.702 m)   Wt 202 lb (91.6 kg)   BMI 31.64 kg/m²

## 2022-04-13 ENCOUNTER — HOSPITAL ENCOUNTER (EMERGENCY)
Age: 57
Discharge: HOME | End: 2022-04-13
Attending: EMERGENCY MEDICINE
Payer: COMMERCIAL

## 2022-04-13 ENCOUNTER — TELEPHONE (OUTPATIENT)
Dept: OBGYN CLINIC | Age: 57
End: 2022-04-13

## 2022-04-13 ENCOUNTER — APPOINTMENT (OUTPATIENT)
Dept: CT IMAGING | Age: 57
End: 2022-04-13
Attending: NURSE PRACTITIONER
Payer: COMMERCIAL

## 2022-04-13 VITALS
TEMPERATURE: 98.5 F | OXYGEN SATURATION: 96 % | HEART RATE: 74 BPM | BODY MASS INDEX: 31.39 KG/M2 | SYSTOLIC BLOOD PRESSURE: 138 MMHG | HEIGHT: 67 IN | WEIGHT: 200 LBS | DIASTOLIC BLOOD PRESSURE: 83 MMHG | RESPIRATION RATE: 18 BRPM

## 2022-04-13 DIAGNOSIS — R92.8 ABNORMAL MAMMOGRAM: Primary | ICD-10-CM

## 2022-04-13 DIAGNOSIS — G44.309 POST-TRAUMATIC HEADACHE, NOT INTRACTABLE, UNSPECIFIED CHRONICITY PATTERN: Primary | ICD-10-CM

## 2022-04-13 PROCEDURE — 70450 CT HEAD/BRAIN W/O DYE: CPT

## 2022-04-13 PROCEDURE — 99284 EMERGENCY DEPT VISIT MOD MDM: CPT

## 2022-04-13 PROCEDURE — 74011250637 HC RX REV CODE- 250/637: Performed by: NURSE PRACTITIONER

## 2022-04-13 RX ORDER — BUTALBITAL, ACETAMINOPHEN AND CAFFEINE 50; 325; 40 MG/1; MG/1; MG/1
1 TABLET ORAL
Status: COMPLETED | OUTPATIENT
Start: 2022-04-13 | End: 2022-04-13

## 2022-04-13 RX ADMIN — BUTALBITAL, ACETAMINOPHEN, AND CAFFEINE 1 TABLET: 50; 325; 40 TABLET ORAL at 14:20

## 2022-04-13 NOTE — TELEPHONE ENCOUNTER
Spoke with the patient regarding the need for additional imaging of her right breast due to a mass. Patient would like to have the imaging performed at [address] [place] [address]. Appt scheduled on 04/21/22 at 3:00 with a 2:30 arrival time. Orders faxed and the patient is aware.
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
## 2022-04-15 LAB
CYTOLOGIST CVX/VAG CYTO: NORMAL
CYTOLOGY CVX/VAG DOC CYTO: NORMAL
CYTOLOGY CVX/VAG DOC THIN PREP: NORMAL
DX ICD CODE: NORMAL
LABCORP, 190119: NORMAL
Lab: NORMAL
OTHER STN SPEC: NORMAL
STAT OF ADQ CVX/VAG CYTO-IMP: NORMAL

## 2022-05-04 DIAGNOSIS — R92.8 ABNORMAL MAMMOGRAM: ICD-10-CM

## 2022-05-09 ENCOUNTER — OFFICE VISIT (OUTPATIENT)
Dept: ENDOCRINOLOGY | Age: 57
End: 2022-05-09
Payer: COMMERCIAL

## 2022-05-09 VITALS
DIASTOLIC BLOOD PRESSURE: 69 MMHG | SYSTOLIC BLOOD PRESSURE: 151 MMHG | HEART RATE: 64 BPM | BODY MASS INDEX: 31.86 KG/M2 | HEIGHT: 67 IN | RESPIRATION RATE: 18 BRPM | OXYGEN SATURATION: 97 % | TEMPERATURE: 98.1 F | WEIGHT: 203 LBS

## 2022-05-09 DIAGNOSIS — E28.2 PCOS (POLYCYSTIC OVARIAN SYNDROME): ICD-10-CM

## 2022-05-09 DIAGNOSIS — E03.9 ACQUIRED HYPOTHYROIDISM: Primary | ICD-10-CM

## 2022-05-09 PROCEDURE — 99214 OFFICE O/P EST MOD 30 MIN: CPT | Performed by: INTERNAL MEDICINE
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
## 2022-05-09 NOTE — PROGRESS NOTES
Ayaan Webber MD           Patient Information  Date:5/9/2022  Name : Ronit Gallardo 64 y.o.     YOB: 1965         Referred by: Jef Sutton MD         History of present illness    Ronit Gallardo is a 64 y.o. female  here for follow-up of hypothyroidism. She had Graves' disease status post radioactive iodine therapy followed by post ablation hypothyroidism on Thyroxine replacement. History of PCOS  Postmenopausal  Had labs at PCPs office  No recent infection, steroids, antibiotics, iodine exposure, biotin use    Type 2 diabetes mellitus, on Metformin and Ozempic        Hirsutism status post laser therapy. No history of gluten sensitivity. Family history of thyroid disease. Father has type 1 diabetes          Wt Readings from Last 3 Encounters:   05/09/22 203 lb (92.1 kg)   04/13/22 200 lb (90.7 kg)   04/11/22 202 lb (91.6 kg)       Past Medical History:   Diagnosis Date    Abnormal Papanicolaou smear of cervix     DM (diabetes mellitus) ([address]     DVT (deep venous thrombosis) ([facility])     Fatty liver     History of cervical dysplasia 4/7/2022    HTN (hypertension)     Hypercholesterolemia 4/7/2022    Hyperlipidemia     Hypertension 4/7/2022    Hypothyroidism     Menopausal state     Obesity 4/7/2022    PCOS (polycystic ovarian syndrome)     Rosacea     Sleep apnea        Current Outpatient Medications   Medication Sig    Unithroid 200 mcg tablet TAKE 1 TABLET BY MOUTH EVERY DAY BEFORE BREAKFAST AND 1/2 TABLET ON SUNDAYS    semaglutide (Ozempic) 0.25 mg/0.2 mL (2 mg/1.5 mL) sub-q pen 0.5 mg by SubCUTAneous route every seven (7) days.  vitamin e (E GEMS) 1,000 unit capsule Take 400 Units by mouth two (2) times a day.  rosuvastatin (CRESTOR) 40 mg tablet TK 1 T PO QD    omega-3 acid ethyl esters (LOVAZA) 1 gram capsule Take 2 g by mouth two (2) times a day.  fenofibrate (LOFIBRA) 160 mg tablet Take 160 mg by mouth daily.  metFORMIN (GLUCOPHAGE) 500 mg tablet Take 1,000 mg by mouth two (2) times daily (with meals).  lisinopril (PRINIVIL, ZESTRIL) 20 mg tablet Take 30 mg by mouth daily. No current facility-administered medications for this visit. No Known Allergies      Review of Systems: Per HPI    Physical Examination:  Blood pressure (!) 151/69, pulse 64, temperature 98.1 °F (36.7 °C), temperature source Temporal, resp. rate 18, height 5' 7\" (1.702 m), weight 203 lb (92.1 kg), SpO2 97 %. - General: pleasant, no distress, good eye contact  - HEENT: no exopthalmos, no periorbital edema, no scleral/conjunctival injection, EOMI, no lid lag or stare  - Neck: supple,  - Cardiovascular: regular, normal rate, normal S1 and S2,  - Respiratory: clear to auscultation bilaterally  - Musculoskeletal: no proximal muscle weakness in upper or lower extremities  - Integumentary: no tremors, no edema,  - Neurological:alert and oriented   - Psychiatric: normal mood and affect    Data Reviewed:     [x] Reviewed labs    TSH April 2021 0.11  TSH Feb /2022 2.3   TSH April 2022 normal  BMP normal  Assessment/Plan:     1. Acquired hypothyroidism    2. PCOS (polycystic ovarian syndrome)        Primary hypothyroidism    Unithroid 200 mcg 1 tablet daily , none on Sunday, changed May 2022        PCOS - continue metformin  History of DVT  Postmenopausal  No known gluten sensitivity, she is cutting down on the wheat products  Off OCPs  Metformin    Nonmorbid obesity:    Type 2 DM -Metformin, Ozempic      There are no Patient Instructions on file for this visit. Follow-up and Dispositions    · Return in about 1 year (around 5/9/2023). Patient /caregiver verbalized understanding   Voice-recognition software was used to generate this report, which may result in some phonetic-based errors in the grammar and contents.   Even though attempts were made to correct all the mistakes, some may have been missed and remained in the body of the report.

## 2022-10-27 ENCOUNTER — TELEPHONE (OUTPATIENT)
Dept: OBGYN CLINIC | Age: 57
End: 2022-10-27

## 2022-10-27 DIAGNOSIS — R92.8 ABNORMAL MAMMOGRAM: Primary | ICD-10-CM

## 2022-10-27 NOTE — TELEPHONE ENCOUNTER
Contacted the patient to let her know it is time for her 6 month follow up right breast mammogram.  She has been scheduled at [address] [place] [address] on 11/07/22 at 3:00 with a 2:30 arrival time. Order faxed.

## 2022-11-09 DIAGNOSIS — R92.8 ABNORMAL MAMMOGRAM: ICD-10-CM

## 2023-05-18 RX ORDER — LEVOTHYROXINE SODIUM 0.2 MG/1
TABLET ORAL
COMMUNITY
Start: 2022-02-14

## 2023-05-18 RX ORDER — ROSUVASTATIN CALCIUM 40 MG/1
1 TABLET, COATED ORAL DAILY
COMMUNITY
Start: 2019-01-28

## 2023-05-18 RX ORDER — LISINOPRIL 20 MG/1
30 TABLET ORAL DAILY
COMMUNITY

## 2023-05-18 RX ORDER — CHLORAL HYDRATE 500 MG
2 CAPSULE ORAL 2 TIMES DAILY
COMMUNITY

## 2023-05-18 RX ORDER — FENOFIBRATE 160 MG/1
160 TABLET ORAL DAILY
COMMUNITY

## 2023-05-18 RX ORDER — SEMAGLUTIDE 1.34 MG/ML
0.5 INJECTION, SOLUTION SUBCUTANEOUS
COMMUNITY

## 2023-05-18 RX ORDER — MULTIVIT WITH MINERALS/LUTEIN
400 TABLET ORAL 2 TIMES DAILY
COMMUNITY

## 2023-10-12 ENCOUNTER — TELEPHONE (OUTPATIENT)
Age: 58
End: 2023-10-12

## 2023-10-12 NOTE — TELEPHONE ENCOUNTER
Patient left a voicemail on 10/12 at 11:07 am. Needing to schedule an appointment for her mammogram.     I returned the patients call on 10/12 at 11:19 AM. Would only like to schedule an appointment for her mammogram and not for her annual exam.